# Patient Record
Sex: FEMALE | Race: WHITE
[De-identification: names, ages, dates, MRNs, and addresses within clinical notes are randomized per-mention and may not be internally consistent; named-entity substitution may affect disease eponyms.]

---
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
## 2021-11-22 NOTE — NUR
AWAITING D/C PAPERS
BIB FATHER. PT C/O RIGHT EAR ACHE X3 DAYS. PT STATES FEELING "STUFFY/CLOGGED" 
EAR ACHE  AND HEARING A WHISTLING SOUND IN RIGHT EAR. WITH 6/10 PAIN. RIGHT 
TYMPANIC MEMBRANE INTACT, CONCAVE, SHINY. MINIMAL CERUMEN NOTED. NO OBJECT 
NOTED IN RIGHT EAR. PT STATES HEARING LOSS IN RIGHT EAR AT THIS TIME. PT DENIES 
DIZZINESS N/V. VSS. ER MD AWARE. CONTINUE TO MONITOR.
PT AMBULATES TO BED 9
PT IN BED RETING IN POSITION OF COMFORT WITH FATHER AT BEDSIDE. VSS. ER MD 
AWARE. CONTINUE TO MONITOR.
PT IN BED RETING IN POSITION OF COMFORT WITH FATHER AT BEDSIDE. VSS. ER MD 
AWARE. CONTINUE TO MONITOR.
PT IN BED RETING IN POSITION OF COMFORT WITH FATHER AT BEDSIDE. VSS. ER MD 
AWARE. CONTINUE TO MONITOR.
PT IN BED RETING IN POSITION OF COMFORT WITH FATHER AT BEDSIDE. VSS. ER MD 
AWARE. CONTINUE TO MONITOR.
PT IN BED RETING IN POSITION OF COMFORT WITH FATHER AT BEDSIDE. VSS. ER MD 
AWARE. CONTINUE TO MONITOR.
PT IN BED RETING IN POSITION OF COMFORT WITH FATHER AT BEDSIDE. VSS. ER MD 
AWARE. CONTINUE TO MONITOR.
Patient discharged with v/s stable. Written and verbal after care instructions 
given and explained. Patient alert, oriented and verbalized understanding of 
instructions. Ambulatory with steady gait. All questions addressed prior to 
discharge. ID band removed. Patient advised to follow up with PMD. Rx of 
Allegra-D and Prednisone given. Patient educated on indication of medication 
including possible reaction and side effects. Opportunity to ask questions 
provided and answered.
Yes